# Patient Record
Sex: MALE | Race: BLACK OR AFRICAN AMERICAN | NOT HISPANIC OR LATINO | URBAN - METROPOLITAN AREA
[De-identification: names, ages, dates, MRNs, and addresses within clinical notes are randomized per-mention and may not be internally consistent; named-entity substitution may affect disease eponyms.]

---

## 2020-09-11 ENCOUNTER — EMERGENCY (EMERGENCY)
Facility: HOSPITAL | Age: 45
LOS: 1 days | Discharge: HOME | End: 2020-09-11
Attending: EMERGENCY MEDICINE | Admitting: EMERGENCY MEDICINE
Payer: MEDICAID

## 2020-09-11 VITALS
RESPIRATION RATE: 17 BRPM | TEMPERATURE: 99 F | DIASTOLIC BLOOD PRESSURE: 97 MMHG | OXYGEN SATURATION: 97 % | HEART RATE: 70 BPM | SYSTOLIC BLOOD PRESSURE: 155 MMHG

## 2020-09-11 VITALS
HEART RATE: 117 BPM | TEMPERATURE: 99 F | SYSTOLIC BLOOD PRESSURE: 108 MMHG | RESPIRATION RATE: 16 BRPM | DIASTOLIC BLOOD PRESSURE: 89 MMHG | OXYGEN SATURATION: 100 %

## 2020-09-11 LAB
ALBUMIN SERPL ELPH-MCNC: 5.1 G/DL — HIGH (ref 3.3–5)
ALP SERPL-CCNC: 103 U/L — SIGNIFICANT CHANGE UP (ref 40–120)
ALT FLD-CCNC: 184 U/L — HIGH (ref 4–41)
ANION GAP SERPL CALC-SCNC: 24 MMO/L — HIGH (ref 7–14)
ANION GAP SERPL CALC-SCNC: 28 MMO/L — HIGH (ref 7–14)
AST SERPL-CCNC: 427 U/L — HIGH (ref 4–40)
BASOPHILS # BLD AUTO: 0.03 K/UL — SIGNIFICANT CHANGE UP (ref 0–0.2)
BASOPHILS NFR BLD AUTO: 0.6 % — SIGNIFICANT CHANGE UP (ref 0–2)
BILIRUB SERPL-MCNC: 0.9 MG/DL — SIGNIFICANT CHANGE UP (ref 0.2–1.2)
BUN SERPL-MCNC: 16 MG/DL — SIGNIFICANT CHANGE UP (ref 7–23)
BUN SERPL-MCNC: 17 MG/DL — SIGNIFICANT CHANGE UP (ref 7–23)
CALCIUM SERPL-MCNC: 8.4 MG/DL — SIGNIFICANT CHANGE UP (ref 8.4–10.5)
CALCIUM SERPL-MCNC: 9.9 MG/DL — SIGNIFICANT CHANGE UP (ref 8.4–10.5)
CHLORIDE SERPL-SCNC: 87 MMOL/L — LOW (ref 98–107)
CHLORIDE SERPL-SCNC: 92 MMOL/L — LOW (ref 98–107)
CO2 SERPL-SCNC: 18 MMOL/L — LOW (ref 22–31)
CO2 SERPL-SCNC: 21 MMOL/L — LOW (ref 22–31)
CREAT SERPL-MCNC: 1.3 MG/DL — SIGNIFICANT CHANGE UP (ref 0.5–1.3)
CREAT SERPL-MCNC: 1.52 MG/DL — HIGH (ref 0.5–1.3)
EOSINOPHIL # BLD AUTO: 0.02 K/UL — SIGNIFICANT CHANGE UP (ref 0–0.5)
EOSINOPHIL NFR BLD AUTO: 0.4 % — SIGNIFICANT CHANGE UP (ref 0–6)
GLUCOSE SERPL-MCNC: 61 MG/DL — LOW (ref 70–99)
GLUCOSE SERPL-MCNC: 64 MG/DL — LOW (ref 70–99)
HCT VFR BLD CALC: 48.5 % — SIGNIFICANT CHANGE UP (ref 39–50)
HGB BLD-MCNC: 15.3 G/DL — SIGNIFICANT CHANGE UP (ref 13–17)
IMM GRANULOCYTES NFR BLD AUTO: 0.6 % — SIGNIFICANT CHANGE UP (ref 0–1.5)
LIDOCAIN IGE QN: 33.1 U/L — SIGNIFICANT CHANGE UP (ref 7–60)
LIDOCAIN IGE QN: 38.7 U/L — SIGNIFICANT CHANGE UP (ref 7–60)
LYMPHOCYTES # BLD AUTO: 1.33 K/UL — SIGNIFICANT CHANGE UP (ref 1–3.3)
LYMPHOCYTES # BLD AUTO: 25 % — SIGNIFICANT CHANGE UP (ref 13–44)
MCHC RBC-ENTMCNC: 24.4 PG — LOW (ref 27–34)
MCHC RBC-ENTMCNC: 31.5 % — LOW (ref 32–36)
MCV RBC AUTO: 77.5 FL — LOW (ref 80–100)
MONOCYTES # BLD AUTO: 0.74 K/UL — SIGNIFICANT CHANGE UP (ref 0–0.9)
MONOCYTES NFR BLD AUTO: 13.9 % — SIGNIFICANT CHANGE UP (ref 2–14)
NEUTROPHILS # BLD AUTO: 3.18 K/UL — SIGNIFICANT CHANGE UP (ref 1.8–7.4)
NEUTROPHILS NFR BLD AUTO: 59.5 % — SIGNIFICANT CHANGE UP (ref 43–77)
NRBC # FLD: 0 K/UL — SIGNIFICANT CHANGE UP (ref 0–0)
PLATELET # BLD AUTO: 144 K/UL — LOW (ref 150–400)
PMV BLD: 11.4 FL — SIGNIFICANT CHANGE UP (ref 7–13)
POTASSIUM SERPL-MCNC: 5.2 MMOL/L — SIGNIFICANT CHANGE UP (ref 3.5–5.3)
POTASSIUM SERPL-MCNC: 5.9 MMOL/L — HIGH (ref 3.5–5.3)
POTASSIUM SERPL-SCNC: 5.2 MMOL/L — SIGNIFICANT CHANGE UP (ref 3.5–5.3)
POTASSIUM SERPL-SCNC: 5.9 MMOL/L — HIGH (ref 3.5–5.3)
PROT SERPL-MCNC: 8.5 G/DL — HIGH (ref 6–8.3)
RBC # BLD: 6.26 M/UL — HIGH (ref 4.2–5.8)
RBC # FLD: 14.5 % — SIGNIFICANT CHANGE UP (ref 10.3–14.5)
SODIUM SERPL-SCNC: 134 MMOL/L — LOW (ref 135–145)
SODIUM SERPL-SCNC: 136 MMOL/L — SIGNIFICANT CHANGE UP (ref 135–145)
TSH SERPL-MCNC: 1.17 UIU/ML — SIGNIFICANT CHANGE UP (ref 0.27–4.2)
WBC # BLD: 5.33 K/UL — SIGNIFICANT CHANGE UP (ref 3.8–10.5)
WBC # FLD AUTO: 5.33 K/UL — SIGNIFICANT CHANGE UP (ref 3.8–10.5)

## 2020-09-11 PROCEDURE — 93010 ELECTROCARDIOGRAM REPORT: CPT

## 2020-09-11 PROCEDURE — 99285 EMERGENCY DEPT VISIT HI MDM: CPT | Mod: 25

## 2020-09-11 RX ORDER — SODIUM CHLORIDE 9 MG/ML
2000 INJECTION INTRAMUSCULAR; INTRAVENOUS; SUBCUTANEOUS ONCE
Refills: 0 | Status: COMPLETED | OUTPATIENT
Start: 2020-09-11 | End: 2020-09-11

## 2020-09-11 RX ADMIN — SODIUM CHLORIDE 2000 MILLILITER(S): 9 INJECTION INTRAMUSCULAR; INTRAVENOUS; SUBCUTANEOUS at 16:51

## 2020-09-11 NOTE — ED PROVIDER NOTE - NSFOLLOWUPINSTRUCTIONS_ED_ALL_ED_FT
LIGHTHEADEDNESS - [facility](R) Instructions(ER/ED)     Lightheadedness    WHAT YOU NEED TO KNOW:    Lightheadedness is the feeling that you may faint, but you do not. Your heartbeat may be fast or feel like it flutters. Lightheadedness may occur when you take certain medicines, such as medicine to lower your blood pressure. Dehydration, low sodium, low blood sugar, an abnormal heart rhythm, and anxiety are other common causes.     DISCHARGE INSTRUCTIONS:    Return to the emergency department if:     You have sudden chest pain.       You have trouble breathing or shortness of breath.       You have vision changes, are sweating, and have nausea while you are sitting or lying down.       You feel flushed and your heart is fluttering.      You faint.     Contact your healthcare provider if:     You feel lightheaded often.       Your heart beats faster or slower than usual.       You have questions or concerns about your condition or care.    Follow up with your healthcare provider as directed: You may need more tests to help find the cause of your lightheadedness. The tests will help healthcare providers plan the best treatment for you. Write down your questions so you remember to ask them during your visits.     Self-care: Talk with your healthcare provider about these and other ways to manage your symptoms:    Lie down when you feel lightheaded, your throat gets tight, or your vision changes. Raise your legs above the level of your heart.      Stand up slowly. Sit on the side of the bed or couch for a few minutes before you stand up.       Take slow, deep breaths when you feel lightheaded. This can help decrease the feeling that you might faint.       Ask if you need to avoid hot baths and saunas. These may make your symptoms worse.     Watch for signs of low blood sugar: These include hunger, nervousness, sweating, and fast or fluttery heartbeats. Talk with your healthcare provider about ways to keep your blood sugar level steady.    Check your blood pressure often: You should do this especially if you take medicine to lower your blood pressure. Check your blood pressure when you are lying down and when you are standing. Ask how often to check during the day. Keep a record of your blood pressure numbers. Your healthcare provider may use the record to help plan your treatment.    Keep a record of your lightheadedness episodes: Include your symptoms and your activity before and after the episode. The record can help your healthcare provider find the cause of your lightheadedness and help you manage episodes.       © Copyright [employer] 2020       back to top                      © Copyright [employer] 2020

## 2020-09-11 NOTE — ED ADULT NURSE NOTE - OBJECTIVE STATEMENT
A&Ox3, pt. states that he has a syncopal episode today [place]. Denies hitting his head but endorses a brief LOC. He states that he currently feels dizzy and weak. IV 20G inserted LAC. Pt. advised to call for staff assistance when needing to get OOB, pt. verbalized understanding.

## 2020-09-11 NOTE — ED PROVIDER NOTE - OBJECTIVE STATEMENT
44yo m pmh anxiety presents s/p fall. Pt states he didn't eat this morning and felt lightheaded and had a near syncopal episode. Did not hit head. No LOC. Pt also complains of mild abdominal pain x months worse w/ alcohol use.

## 2020-09-11 NOTE — ED PROVIDER NOTE - PATIENT PORTAL LINK FT
You can access the FollowMyHealth Patient Portal offered by [facility] by registering at the following website: [email or web address]. By joining [facility] FollowMyHealth portal, you will also be able to view your health information using other applications (apps) compatible with our system.

## 2020-09-11 NOTE — ED ADULT TRIAGE NOTE - CHIEF COMPLAINT QUOTE
pt reports feeling weak, dehydrated, n/v  x 2 days and fell while walking this afternoon. pt denies hitting head, LOC. pt with abrasion to R arm.  pt denies chest pain, SOB. pmh anxiety

## 2020-09-11 NOTE — ED PROVIDER NOTE - CLINICAL SUMMARY MEDICAL DECISION MAKING FREE TEXT BOX
pt with hx of ETOH use w/ near syncopal episode, likely 2/2 dehydration, no acute complaints at this time, LFTs found to be elevated as well as cr; pt would like to f/u outpt w/ GI and renal. k hemolysed, will recheck BMP and f/u k and cr.

## 2020-09-11 NOTE — ED PROVIDER NOTE - NS ED ROS FT
Gen: No fever, normal appetite  Eyes: No eye irritation or discharge  ENT: No ear pain, congestion, sore throat  Resp: No cough or trouble breathing  Cardiovascular: No chest pain or palpitation  Gastroenteric: mild abdominal pain   :  No change in urine output; no dysuria  MS: No joint or muscle pain  Skin: No rashes  Neuro: No headache; no abnormal movements  Remainder negative, except as per the HPI

## 2021-03-04 ENCOUNTER — HOSPITAL ENCOUNTER (INPATIENT)
Dept: HOSPITAL 74 - YASAS | Age: 46
LOS: 3 days | Discharge: OTHER INSTITUTION | End: 2021-03-07
Attending: ALLERGY & IMMUNOLOGY | Admitting: ALLERGY & IMMUNOLOGY
Payer: COMMERCIAL

## 2021-03-04 VITALS — BODY MASS INDEX: 24.2 KG/M2

## 2021-03-04 DIAGNOSIS — F32.9: ICD-10-CM

## 2021-03-04 DIAGNOSIS — F19.282: ICD-10-CM

## 2021-03-04 DIAGNOSIS — F17.210: ICD-10-CM

## 2021-03-04 DIAGNOSIS — F19.24: ICD-10-CM

## 2021-03-04 DIAGNOSIS — R21: ICD-10-CM

## 2021-03-04 DIAGNOSIS — F19.280: ICD-10-CM

## 2021-03-04 DIAGNOSIS — F10.230: Primary | ICD-10-CM

## 2021-03-04 DIAGNOSIS — Z62.810: ICD-10-CM

## 2021-03-04 PROCEDURE — C9803 HOPD COVID-19 SPEC COLLECT: HCPCS

## 2021-03-04 PROCEDURE — U0003 INFECTIOUS AGENT DETECTION BY NUCLEIC ACID (DNA OR RNA); SEVERE ACUTE RESPIRATORY SYNDROME CORONAVIRUS 2 (SARS-COV-2) (CORONAVIRUS DISEASE [COVID-19]), AMPLIFIED PROBE TECHNIQUE, MAKING USE OF HIGH THROUGHPUT TECHNOLOGIES AS DESCRIBED BY CMS-2020-01-R: HCPCS

## 2021-03-05 LAB
ALBUMIN SERPL-MCNC: 4.1 G/DL (ref 3.4–5)
ALP SERPL-CCNC: 85 U/L (ref 45–117)
ALT SERPL-CCNC: 50 U/L (ref 13–61)
ANION GAP SERPL CALC-SCNC: 7 MMOL/L (ref 8–16)
AST SERPL-CCNC: 61 U/L (ref 15–37)
BILIRUB SERPL-MCNC: 1.3 MG/DL (ref 0.2–1)
BUN SERPL-MCNC: 11.5 MG/DL (ref 7–18)
CALCIUM SERPL-MCNC: 8.9 MG/DL (ref 8.5–10.1)
CHLORIDE SERPL-SCNC: 97 MMOL/L (ref 98–107)
CO2 SERPL-SCNC: 31 MMOL/L (ref 21–32)
CREAT SERPL-MCNC: 0.9 MG/DL (ref 0.55–1.3)
DEPRECATED RDW RBC AUTO: 20.1 % (ref 11.9–15.9)
GLUCOSE SERPL-MCNC: 102 MG/DL (ref 74–106)
HCT VFR BLD CALC: 38.7 % (ref 35.4–49)
HGB BLD-MCNC: 12.7 GM/DL (ref 11.7–16.9)
MCH RBC QN AUTO: 25.9 PG (ref 25.7–33.7)
MCHC RBC AUTO-ENTMCNC: 32.8 G/DL (ref 32–35.9)
MCV RBC: 79.1 FL (ref 80–96)
PLATELET # BLD AUTO: 267 K/MM3 (ref 134–434)
PMV BLD: 9.2 FL (ref 7.5–11.1)
POTASSIUM SERPLBLD-SCNC: 3.6 MMOL/L (ref 3.5–5.1)
PROT SERPL-MCNC: 7.7 G/DL (ref 6.4–8.2)
RBC # BLD AUTO: 4.88 M/MM3 (ref 4–5.6)
SODIUM SERPL-SCNC: 136 MMOL/L (ref 136–145)
WBC # BLD AUTO: 3.4 K/MM3 (ref 4–10)

## 2021-03-05 PROCEDURE — HZ2ZZZZ DETOXIFICATION SERVICES FOR SUBSTANCE ABUSE TREATMENT: ICD-10-PCS | Performed by: ALLERGY & IMMUNOLOGY

## 2021-03-05 RX ADMIN — Medication SCH TAB: at 11:37

## 2021-03-05 RX ADMIN — Medication SCH MG: at 00:33

## 2021-03-05 RX ADMIN — NICOTINE SCH MG: 7 PATCH TRANSDERMAL at 11:37

## 2021-03-05 RX ADMIN — MIRTAZAPINE SCH MG: 15 TABLET, FILM COATED ORAL at 22:23

## 2021-03-05 RX ADMIN — Medication SCH MG: at 22:22

## 2021-03-06 RX ADMIN — Medication SCH MG: at 22:31

## 2021-03-06 RX ADMIN — Medication SCH TAB: at 10:19

## 2021-03-06 RX ADMIN — NICOTINE SCH MG: 7 PATCH TRANSDERMAL at 10:19

## 2021-03-06 RX ADMIN — MIRTAZAPINE SCH MG: 15 TABLET, FILM COATED ORAL at 22:31

## 2021-03-07 ENCOUNTER — HOSPITAL ENCOUNTER (INPATIENT)
Dept: HOSPITAL 74 - YASAS | Age: 46
LOS: 15 days | Discharge: HOME | DRG: 772 | End: 2021-03-22
Attending: ALLERGY & IMMUNOLOGY | Admitting: ALLERGY & IMMUNOLOGY
Payer: COMMERCIAL

## 2021-03-07 VITALS — HEART RATE: 94 BPM | DIASTOLIC BLOOD PRESSURE: 96 MMHG | SYSTOLIC BLOOD PRESSURE: 138 MMHG | TEMPERATURE: 97 F

## 2021-03-07 DIAGNOSIS — F10.20: Primary | ICD-10-CM

## 2021-03-07 DIAGNOSIS — F17.210: ICD-10-CM

## 2021-03-07 PROCEDURE — U0003 INFECTIOUS AGENT DETECTION BY NUCLEIC ACID (DNA OR RNA); SEVERE ACUTE RESPIRATORY SYNDROME CORONAVIRUS 2 (SARS-COV-2) (CORONAVIRUS DISEASE [COVID-19]), AMPLIFIED PROBE TECHNIQUE, MAKING USE OF HIGH THROUGHPUT TECHNOLOGIES AS DESCRIBED BY CMS-2020-01-R: HCPCS

## 2021-03-07 PROCEDURE — C9803 HOPD COVID-19 SPEC COLLECT: HCPCS

## 2021-03-07 PROCEDURE — HZ42ZZZ GROUP COUNSELING FOR SUBSTANCE ABUSE TREATMENT, COGNITIVE-BEHAVIORAL: ICD-10-PCS | Performed by: ALLERGY & IMMUNOLOGY

## 2021-03-07 PROCEDURE — G0008 ADMIN INFLUENZA VIRUS VAC: HCPCS

## 2021-03-07 RX ADMIN — Medication SCH MG: at 22:11

## 2021-03-07 RX ADMIN — Medication SCH TAB: at 10:56

## 2021-03-07 RX ADMIN — MIRTAZAPINE SCH MG: 15 TABLET, FILM COATED ORAL at 22:11

## 2021-03-07 RX ADMIN — NICOTINE SCH: 7 PATCH TRANSDERMAL at 10:57

## 2021-03-07 RX ADMIN — Medication SCH MG: at 22:10

## 2021-03-08 RX ADMIN — Medication SCH TAB: at 09:34

## 2021-03-08 RX ADMIN — PSEUDOEPHEDRINE HCL AND TRIPROLIDINE HCL PRN COMBO: 60; 2.5 TABLET, FILM COATED ORAL at 09:35

## 2021-03-08 RX ADMIN — MIRTAZAPINE SCH MG: 15 TABLET, FILM COATED ORAL at 21:20

## 2021-03-08 RX ADMIN — NICOTINE SCH MG: 14 PATCH, EXTENDED RELEASE TRANSDERMAL at 13:50

## 2021-03-08 RX ADMIN — Medication SCH MG: at 21:21

## 2021-03-09 RX ADMIN — Medication SCH MG: at 21:13

## 2021-03-09 RX ADMIN — PSEUDOEPHEDRINE HCL AND TRIPROLIDINE HCL PRN COMBO: 60; 2.5 TABLET, FILM COATED ORAL at 21:13

## 2021-03-09 RX ADMIN — MIRTAZAPINE SCH MG: 15 TABLET, FILM COATED ORAL at 21:11

## 2021-03-09 RX ADMIN — Medication SCH TAB: at 09:36

## 2021-03-09 RX ADMIN — NICOTINE SCH MG: 14 PATCH, EXTENDED RELEASE TRANSDERMAL at 09:36

## 2021-03-10 RX ADMIN — PSEUDOEPHEDRINE HCL AND TRIPROLIDINE HCL PRN COMBO: 60; 2.5 TABLET, FILM COATED ORAL at 21:40

## 2021-03-10 RX ADMIN — NICOTINE SCH MG: 14 PATCH, EXTENDED RELEASE TRANSDERMAL at 09:55

## 2021-03-10 RX ADMIN — Medication SCH MG: at 21:39

## 2021-03-10 RX ADMIN — Medication SCH MG: at 21:41

## 2021-03-10 RX ADMIN — MIRTAZAPINE SCH MG: 15 TABLET, FILM COATED ORAL at 21:40

## 2021-03-10 RX ADMIN — Medication SCH TAB: at 09:55

## 2021-03-11 RX ADMIN — Medication SCH MG: at 21:12

## 2021-03-11 RX ADMIN — PSEUDOEPHEDRINE HCL AND TRIPROLIDINE HCL PRN COMBO: 60; 2.5 TABLET, FILM COATED ORAL at 09:51

## 2021-03-11 RX ADMIN — NICOTINE SCH MG: 14 PATCH, EXTENDED RELEASE TRANSDERMAL at 09:50

## 2021-03-11 RX ADMIN — Medication SCH TAB: at 09:50

## 2021-03-11 RX ADMIN — MIRTAZAPINE SCH MG: 15 TABLET, FILM COATED ORAL at 21:12

## 2021-03-11 RX ADMIN — PSEUDOEPHEDRINE HCL AND TRIPROLIDINE HCL PRN COMBO: 60; 2.5 TABLET, FILM COATED ORAL at 21:13

## 2021-03-12 RX ADMIN — MIRTAZAPINE SCH MG: 15 TABLET, FILM COATED ORAL at 21:23

## 2021-03-12 RX ADMIN — NICOTINE SCH MG: 14 PATCH, EXTENDED RELEASE TRANSDERMAL at 09:32

## 2021-03-12 RX ADMIN — Medication SCH MG: at 21:23

## 2021-03-12 RX ADMIN — PSEUDOEPHEDRINE HCL AND TRIPROLIDINE HCL PRN COMBO: 60; 2.5 TABLET, FILM COATED ORAL at 21:25

## 2021-03-12 RX ADMIN — Medication SCH TAB: at 09:32

## 2021-03-13 RX ADMIN — PSEUDOEPHEDRINE HCL AND TRIPROLIDINE HCL PRN COMBO: 60; 2.5 TABLET, FILM COATED ORAL at 21:29

## 2021-03-13 RX ADMIN — MIRTAZAPINE SCH MG: 15 TABLET, FILM COATED ORAL at 21:28

## 2021-03-13 RX ADMIN — Medication SCH TAB: at 09:49

## 2021-03-13 RX ADMIN — Medication SCH MG: at 21:28

## 2021-03-13 RX ADMIN — NICOTINE SCH MG: 14 PATCH, EXTENDED RELEASE TRANSDERMAL at 09:49

## 2021-03-14 RX ADMIN — MIRTAZAPINE SCH MG: 15 TABLET, FILM COATED ORAL at 21:15

## 2021-03-14 RX ADMIN — PSEUDOEPHEDRINE HCL AND TRIPROLIDINE HCL PRN COMBO: 60; 2.5 TABLET, FILM COATED ORAL at 21:15

## 2021-03-14 RX ADMIN — Medication SCH MG: at 21:15

## 2021-03-14 RX ADMIN — NICOTINE SCH MG: 14 PATCH, EXTENDED RELEASE TRANSDERMAL at 09:57

## 2021-03-14 RX ADMIN — Medication SCH TAB: at 09:57

## 2021-03-15 RX ADMIN — Medication SCH MG: at 21:13

## 2021-03-15 RX ADMIN — MIRTAZAPINE SCH MG: 15 TABLET, FILM COATED ORAL at 21:13

## 2021-03-15 RX ADMIN — Medication SCH TAB: at 09:56

## 2021-03-15 RX ADMIN — PSEUDOEPHEDRINE HCL AND TRIPROLIDINE HCL PRN COMBO: 60; 2.5 TABLET, FILM COATED ORAL at 21:14

## 2021-03-15 RX ADMIN — NICOTINE SCH MG: 14 PATCH, EXTENDED RELEASE TRANSDERMAL at 09:55

## 2021-03-16 RX ADMIN — Medication SCH MG: at 21:22

## 2021-03-16 RX ADMIN — MIRTAZAPINE SCH MG: 15 TABLET, FILM COATED ORAL at 21:22

## 2021-03-16 RX ADMIN — NICOTINE SCH: 14 PATCH, EXTENDED RELEASE TRANSDERMAL at 10:29

## 2021-03-16 RX ADMIN — Medication SCH: at 10:29

## 2021-03-16 RX ADMIN — PSEUDOEPHEDRINE HCL AND TRIPROLIDINE HCL PRN COMBO: 60; 2.5 TABLET, FILM COATED ORAL at 21:24

## 2021-03-17 RX ADMIN — Medication SCH MG: at 21:24

## 2021-03-17 RX ADMIN — MIRTAZAPINE SCH MG: 15 TABLET, FILM COATED ORAL at 21:25

## 2021-03-17 RX ADMIN — PSEUDOEPHEDRINE HCL AND TRIPROLIDINE HCL PRN COMBO: 60; 2.5 TABLET, FILM COATED ORAL at 21:24

## 2021-03-17 RX ADMIN — NICOTINE SCH MG: 14 PATCH, EXTENDED RELEASE TRANSDERMAL at 10:07

## 2021-03-17 RX ADMIN — Medication SCH TAB: at 10:07

## 2021-03-18 RX ADMIN — MIRTAZAPINE SCH MG: 15 TABLET, FILM COATED ORAL at 21:21

## 2021-03-18 RX ADMIN — Medication SCH TAB: at 10:06

## 2021-03-18 RX ADMIN — Medication SCH MG: at 21:19

## 2021-03-18 RX ADMIN — PSEUDOEPHEDRINE HCL AND TRIPROLIDINE HCL PRN COMBO: 60; 2.5 TABLET, FILM COATED ORAL at 21:20

## 2021-03-18 RX ADMIN — NICOTINE SCH MG: 14 PATCH, EXTENDED RELEASE TRANSDERMAL at 10:06

## 2021-03-19 RX ADMIN — Medication SCH MG: at 21:08

## 2021-03-19 RX ADMIN — Medication SCH TAB: at 10:20

## 2021-03-19 RX ADMIN — NICOTINE SCH MG: 14 PATCH, EXTENDED RELEASE TRANSDERMAL at 10:21

## 2021-03-19 RX ADMIN — PSEUDOEPHEDRINE HCL AND TRIPROLIDINE HCL PRN COMBO: 60; 2.5 TABLET, FILM COATED ORAL at 21:08

## 2021-03-19 RX ADMIN — MIRTAZAPINE SCH MG: 15 TABLET, FILM COATED ORAL at 21:09

## 2021-03-20 RX ADMIN — NICOTINE SCH MG: 14 PATCH, EXTENDED RELEASE TRANSDERMAL at 10:02

## 2021-03-20 RX ADMIN — Medication SCH MG: at 21:53

## 2021-03-20 RX ADMIN — Medication SCH TAB: at 10:02

## 2021-03-20 RX ADMIN — PSEUDOEPHEDRINE HCL AND TRIPROLIDINE HCL PRN COMBO: 60; 2.5 TABLET, FILM COATED ORAL at 21:52

## 2021-03-20 RX ADMIN — MIRTAZAPINE SCH MG: 15 TABLET, FILM COATED ORAL at 21:52

## 2021-03-20 RX ADMIN — Medication SCH MG: at 21:52

## 2021-03-21 RX ADMIN — PSEUDOEPHEDRINE HCL AND TRIPROLIDINE HCL PRN COMBO: 60; 2.5 TABLET, FILM COATED ORAL at 21:26

## 2021-03-21 RX ADMIN — NICOTINE SCH MG: 14 PATCH, EXTENDED RELEASE TRANSDERMAL at 09:47

## 2021-03-21 RX ADMIN — Medication SCH MG: at 21:24

## 2021-03-21 RX ADMIN — MIRTAZAPINE SCH MG: 15 TABLET, FILM COATED ORAL at 21:24

## 2021-03-21 RX ADMIN — Medication SCH TAB: at 09:47

## 2021-03-22 VITALS — HEART RATE: 96 BPM | TEMPERATURE: 96 F | DIASTOLIC BLOOD PRESSURE: 93 MMHG | SYSTOLIC BLOOD PRESSURE: 133 MMHG

## 2021-03-22 RX ADMIN — NICOTINE SCH MG: 14 PATCH, EXTENDED RELEASE TRANSDERMAL at 10:06

## 2021-03-22 RX ADMIN — Medication SCH TAB: at 10:06
